# Patient Record
Sex: FEMALE | Race: BLACK OR AFRICAN AMERICAN | NOT HISPANIC OR LATINO | URBAN - METROPOLITAN AREA
[De-identification: names, ages, dates, MRNs, and addresses within clinical notes are randomized per-mention and may not be internally consistent; named-entity substitution may affect disease eponyms.]

---

## 2017-12-18 ENCOUNTER — OUTPATIENT (OUTPATIENT)
Dept: OUTPATIENT SERVICES | Facility: HOSPITAL | Age: 37
LOS: 1 days | End: 2017-12-18
Payer: COMMERCIAL

## 2017-12-18 PROCEDURE — 76641 ULTRASOUND BREAST COMPLETE: CPT

## 2017-12-18 PROCEDURE — 76641 ULTRASOUND BREAST COMPLETE: CPT | Mod: 26,RT

## 2017-12-20 ENCOUNTER — OUTPATIENT (OUTPATIENT)
Dept: OUTPATIENT SERVICES | Facility: HOSPITAL | Age: 37
LOS: 1 days | End: 2017-12-20
Payer: COMMERCIAL

## 2017-12-20 PROCEDURE — 19083 BX BREAST 1ST LESION US IMAG: CPT

## 2017-12-20 PROCEDURE — 77065 DX MAMMO INCL CAD UNI: CPT

## 2017-12-20 PROCEDURE — 19083 BX BREAST 1ST LESION US IMAG: CPT | Mod: RT

## 2017-12-20 PROCEDURE — 88305 TISSUE EXAM BY PATHOLOGIST: CPT

## 2017-12-20 PROCEDURE — G0206: CPT | Mod: 26,RT

## 2017-12-20 PROCEDURE — A4648: CPT

## 2017-12-21 LAB — SURGICAL PATHOLOGY STUDY: SIGNIFICANT CHANGE UP

## 2019-02-12 ENCOUNTER — OUTPATIENT (OUTPATIENT)
Dept: OUTPATIENT SERVICES | Facility: HOSPITAL | Age: 39
LOS: 1 days | End: 2019-02-12
Payer: COMMERCIAL

## 2019-02-12 PROCEDURE — 76641 ULTRASOUND BREAST COMPLETE: CPT | Mod: 26,50

## 2019-02-12 PROCEDURE — 76641 ULTRASOUND BREAST COMPLETE: CPT

## 2019-10-01 PROBLEM — Z00.00 ENCOUNTER FOR PREVENTIVE HEALTH EXAMINATION: Status: ACTIVE | Noted: 2019-10-01

## 2019-10-02 ENCOUNTER — NON-APPOINTMENT (OUTPATIENT)
Age: 39
End: 2019-10-02

## 2019-10-02 ENCOUNTER — APPOINTMENT (OUTPATIENT)
Dept: HEART AND VASCULAR | Facility: CLINIC | Age: 39
End: 2019-10-02
Payer: COMMERCIAL

## 2019-10-02 VITALS
HEART RATE: 66 BPM | DIASTOLIC BLOOD PRESSURE: 68 MMHG | WEIGHT: 146 LBS | HEIGHT: 69 IN | SYSTOLIC BLOOD PRESSURE: 109 MMHG | OXYGEN SATURATION: 100 % | BODY MASS INDEX: 21.62 KG/M2

## 2019-10-02 DIAGNOSIS — Z86.2 PERSONAL HISTORY OF DISEASES OF THE BLOOD AND BLOOD-FORMING ORGANS AND CERTAIN DISORDERS INVOLVING THE IMMUNE MECHANISM: ICD-10-CM

## 2019-10-02 PROCEDURE — 99204 OFFICE O/P NEW MOD 45 MIN: CPT | Mod: 25

## 2019-10-02 PROCEDURE — 93000 ELECTROCARDIOGRAM COMPLETE: CPT

## 2019-10-03 NOTE — PHYSICAL EXAM
[General Appearance - Well Developed] : well developed [Normal Appearance] : normal appearance [Well Groomed] : well groomed [General Appearance - Well Nourished] : well nourished [No Deformities] : no deformities [General Appearance - In No Acute Distress] : no acute distress [Normal Conjunctiva] : the conjunctiva exhibited no abnormalities [Eyelids - No Xanthelasma] : the eyelids demonstrated no xanthelasmas [No Oral Pallor] : no oral pallor [Normal Oral Mucosa] : normal oral mucosa [No Oral Cyanosis] : no oral cyanosis [Normal Jugular Venous A Waves Present] : normal jugular venous A waves present [Normal Jugular Venous V Waves Present] : normal jugular venous V waves present [No Jugular Venous Doss A Waves] : no jugular venous doss A waves [Heart Rate And Rhythm] : heart rate and rhythm were normal [Heart Sounds] : normal S1 and S2 [Murmurs] : no murmurs present [Respiration, Rhythm And Depth] : normal respiratory rhythm and effort [Exaggerated Use Of Accessory Muscles For Inspiration] : no accessory muscle use [Auscultation Breath Sounds / Voice Sounds] : lungs were clear to auscultation bilaterally [Abdomen Soft] : soft [Abdomen Tenderness] : non-tender [Abdomen Mass (___ Cm)] : no abdominal mass palpated [Abnormal Walk] : normal gait [Gait - Sufficient For Exercise Testing] : the gait was sufficient for exercise testing [Nail Clubbing] : no clubbing of the fingernails [Cyanosis, Localized] : no localized cyanosis [Petechial Hemorrhages (___cm)] : no petechial hemorrhages [Skin Color & Pigmentation] : normal skin color and pigmentation [] : no rash [No Venous Stasis] : no venous stasis [Skin Lesions] : no skin lesions [No Skin Ulcers] : no skin ulcer [Oriented To Time, Place, And Person] : oriented to person, place, and time [No Xanthoma] : no  xanthoma was observed [Affect] : the affect was normal [No Anxiety] : not feeling anxious [Mood] : the mood was normal

## 2019-10-03 NOTE — REASON FOR VISIT
[Initial Evaluation] : an initial evaluation of [Palpitations] : palpitations [FreeTextEntry1] : Pleasant 39 year old female presents secondary to palpitations. Symptoms noted at rest and with activity. They are brief in nature. No syncope or associated dizziness. At times, the presentation is related to stress and certain foods. No syncope noted. She otherwise is in good health. We are discussing a cardiac work up for the above complains as one has not been done recently.. Occasional chest discomfort is noted as times as well. \par

## 2019-10-18 ENCOUNTER — APPOINTMENT (OUTPATIENT)
Dept: HEART AND VASCULAR | Facility: CLINIC | Age: 39
End: 2019-10-18
Payer: COMMERCIAL

## 2019-10-18 VITALS
TEMPERATURE: 98.1 F | SYSTOLIC BLOOD PRESSURE: 119 MMHG | DIASTOLIC BLOOD PRESSURE: 71 MMHG | HEART RATE: 77 BPM | OXYGEN SATURATION: 100 %

## 2019-10-18 PROCEDURE — 93306 TTE W/DOPPLER COMPLETE: CPT

## 2019-10-18 PROCEDURE — 99214 OFFICE O/P EST MOD 30 MIN: CPT | Mod: 25

## 2019-10-18 PROCEDURE — 99214 OFFICE O/P EST MOD 30 MIN: CPT

## 2019-10-18 NOTE — PHYSICAL EXAM
[General Appearance - Well Developed] : well developed [Normal Appearance] : normal appearance [Well Groomed] : well groomed [General Appearance - Well Nourished] : well nourished [No Deformities] : no deformities [General Appearance - In No Acute Distress] : no acute distress [Eyelids - No Xanthelasma] : the eyelids demonstrated no xanthelasmas [Normal Conjunctiva] : the conjunctiva exhibited no abnormalities [Normal Oral Mucosa] : normal oral mucosa [Normal Jugular Venous A Waves Present] : normal jugular venous A waves present [No Oral Cyanosis] : no oral cyanosis [No Oral Pallor] : no oral pallor [Normal Jugular Venous V Waves Present] : normal jugular venous V waves present [No Jugular Venous Doss A Waves] : no jugular venous doss A waves [Respiration, Rhythm And Depth] : normal respiratory rhythm and effort [Exaggerated Use Of Accessory Muscles For Inspiration] : no accessory muscle use [Auscultation Breath Sounds / Voice Sounds] : lungs were clear to auscultation bilaterally [Heart Rate And Rhythm] : heart rate and rhythm were normal [Heart Sounds] : normal S1 and S2 [Abdomen Soft] : soft [Murmurs] : no murmurs present [Abdomen Mass (___ Cm)] : no abdominal mass palpated [Abdomen Tenderness] : non-tender [Abnormal Walk] : normal gait [Gait - Sufficient For Exercise Testing] : the gait was sufficient for exercise testing [Nail Clubbing] : no clubbing of the fingernails [Cyanosis, Localized] : no localized cyanosis [Petechial Hemorrhages (___cm)] : no petechial hemorrhages [Skin Color & Pigmentation] : normal skin color and pigmentation [] : no rash [No Venous Stasis] : no venous stasis [Skin Lesions] : no skin lesions [No Xanthoma] : no  xanthoma was observed [No Skin Ulcers] : no skin ulcer [Oriented To Time, Place, And Person] : oriented to person, place, and time [Affect] : the affect was normal [No Anxiety] : not feeling anxious [Mood] : the mood was normal

## 2019-10-18 NOTE — REASON FOR VISIT
[Follow-Up - Clinic] : a clinic follow-up of [Palpitations] : palpitations [FreeTextEntry1] : Ms. APARICIO presents for a follow up today. She denies chest pain, dyspnea or syncope. No issues reported with her medications. Otherwise, she is feeling well. Remarks on occasional palpitations. Still the final holter report is pending. Echo shows preserved LV function. Holter shows presence of occasional PVCs. \par

## 2019-11-15 ENCOUNTER — APPOINTMENT (OUTPATIENT)
Dept: HEART AND VASCULAR | Facility: CLINIC | Age: 39
End: 2019-11-15

## 2020-05-20 ENCOUNTER — APPOINTMENT (OUTPATIENT)
Dept: HEART AND VASCULAR | Facility: CLINIC | Age: 40
End: 2020-05-20
Payer: COMMERCIAL

## 2020-05-20 PROCEDURE — 99214 OFFICE O/P EST MOD 30 MIN: CPT | Mod: 95

## 2020-05-20 NOTE — PHYSICAL EXAM
[Normal Appearance] : normal appearance [General Appearance - Well Developed] : well developed [General Appearance - Well Nourished] : well nourished [Well Groomed] : well groomed [General Appearance - In No Acute Distress] : no acute distress [No Deformities] : no deformities

## 2020-05-20 NOTE — DISCUSSION/SUMMARY
[FreeTextEntry1] : PVCs, symptomatic holter and echo reviewed, current symptoms appear different\par Palps likely a response to anemia and pregnancy, encouraged PO hydration and balanced diet. Inclined towards a conservative follow up in this patient. We had a careful discussion regarding diet and exercise. Will be happy to re-evaluate.\par

## 2020-05-20 NOTE — REASON FOR VISIT
[Palpitations] : palpitations [Follow-Up - Clinic] : a clinic follow-up of [FreeTextEntry1] : This visit was provided via telehealth using real-time 2 way audio visual technology. The patient, ANGEL APARICIO, was located at home, [address] [place] [address] \[address]\[place] [address] , at the time of the vist. The Doctor, Javier Leigh MD, [facility], was located at his medical office located at [address] [place] [address], [place] [address] at the time of the visit. The patient, ANGEL APARICIO and the Doctor participated in telehealth encounter. Verbal consent was given on May 20, 2020  by the patient, self.\par \par Notes occasional palpitations. This is noted at rest. No syncope or dizziness. She was told that she was low on Fe and had an infusion as well. In addition, she is 9 months pregnant and due in a few weeks.

## 2020-05-26 ENCOUNTER — APPOINTMENT (OUTPATIENT)
Dept: HEART AND VASCULAR | Facility: CLINIC | Age: 40
End: 2020-05-26
Payer: COMMERCIAL

## 2020-05-26 PROCEDURE — 99214 OFFICE O/P EST MOD 30 MIN: CPT | Mod: 95

## 2020-05-26 NOTE — REASON FOR VISIT
[Follow-Up - Clinic] : a clinic follow-up of [Palpitations] : palpitations [FreeTextEntry1] : This visit was provided via telehealth using real-time 2 way audio visual technology. The patient, ANGEL APARICIO, was located at home, [address] [place] [address] \[address]\[place] [address] , at the time of the vist. The Doctor, Javier Leigh MD, [facility], was located at his medical office located at [address] [place] [address], [address] Floor, [place] [address] at the time of the visit. The patient, ANGEL APARICIO and the Doctor participated in telehealth encounter. Verbal consent was given on May 26, 2020  by the patient, self.\par \par Occasional palpitations still noted since last we spoke. No chest discomfort or syncope. However, these are new symptoms since her last cardiac work up with me.

## 2020-05-26 NOTE — DISCUSSION/SUMMARY
[FreeTextEntry1] : Palps/PVCs as the patient is pregnant, will bring in for an echocardiogram provided her insurance company feels that it is a reasonable course of action and deems appropriate to approve.\par

## 2020-05-27 ENCOUNTER — APPOINTMENT (OUTPATIENT)
Dept: HEART AND VASCULAR | Facility: CLINIC | Age: 40
End: 2020-05-27
Payer: COMMERCIAL

## 2020-05-27 ENCOUNTER — APPOINTMENT (OUTPATIENT)
Dept: HEART AND VASCULAR | Facility: CLINIC | Age: 40
End: 2020-05-27

## 2020-05-27 VITALS
SYSTOLIC BLOOD PRESSURE: 106 MMHG | OXYGEN SATURATION: 100 % | BODY MASS INDEX: 27.4 KG/M2 | HEART RATE: 76 BPM | DIASTOLIC BLOOD PRESSURE: 63 MMHG | WEIGHT: 185 LBS | TEMPERATURE: 98.2 F | HEIGHT: 69 IN

## 2020-05-27 PROCEDURE — 99214 OFFICE O/P EST MOD 30 MIN: CPT | Mod: 25

## 2020-05-27 PROCEDURE — 93306 TTE W/DOPPLER COMPLETE: CPT

## 2020-05-28 NOTE — DISCUSSION/SUMMARY
[FreeTextEntry1] : CP/PVCs echo reviewed. Inclined towards a conservative follow up in this patient. We had a careful discussion regarding diet and exercise. Will be happy to re-evaluate.\par Report provided.

## 2020-05-28 NOTE — REASON FOR VISIT
[Follow-Up - Clinic] : a clinic follow-up of [Palpitations] : palpitations [FreeTextEntry1] : Doing well. She completed an echo today as part of evaluation of her palpitations. The study is remarkable for preserved LVEF and no valvular issues. No effusion noted. She is otherwise seem well. B/p normal and heart rate under good control. No syncopal events.

## 2020-05-28 NOTE — PHYSICAL EXAM
[General Appearance - Well Developed] : well developed [Normal Appearance] : normal appearance [Well Groomed] : well groomed [General Appearance - Well Nourished] : well nourished [No Deformities] : no deformities [General Appearance - In No Acute Distress] : no acute distress [Normal Conjunctiva] : the conjunctiva exhibited no abnormalities [Eyelids - No Xanthelasma] : the eyelids demonstrated no xanthelasmas [No Oral Pallor] : no oral pallor [Normal Oral Mucosa] : normal oral mucosa [No Oral Cyanosis] : no oral cyanosis [Normal Jugular Venous A Waves Present] : normal jugular venous A waves present [Normal Jugular Venous V Waves Present] : normal jugular venous V waves present [No Jugular Venous Doss A Waves] : no jugular venous doss A waves [Respiration, Rhythm And Depth] : normal respiratory rhythm and effort [Exaggerated Use Of Accessory Muscles For Inspiration] : no accessory muscle use [Auscultation Breath Sounds / Voice Sounds] : lungs were clear to auscultation bilaterally [Heart Rate And Rhythm] : heart rate and rhythm were normal [Heart Sounds] : normal S1 and S2 [Murmurs] : no murmurs present [Abdomen Soft] : soft [Abdomen Tenderness] : non-tender [Abdomen Mass (___ Cm)] : no abdominal mass palpated [Abnormal Walk] : normal gait [Gait - Sufficient For Exercise Testing] : the gait was sufficient for exercise testing [Nail Clubbing] : no clubbing of the fingernails [Cyanosis, Localized] : no localized cyanosis [Petechial Hemorrhages (___cm)] : no petechial hemorrhages [Skin Color & Pigmentation] : normal skin color and pigmentation [] : no rash [No Venous Stasis] : no venous stasis [Skin Lesions] : no skin lesions [No Skin Ulcers] : no skin ulcer [No Xanthoma] : no  xanthoma was observed [Affect] : the affect was normal [Oriented To Time, Place, And Person] : oriented to person, place, and time [Mood] : the mood was normal [No Anxiety] : not feeling anxious

## 2020-08-25 ENCOUNTER — APPOINTMENT (OUTPATIENT)
Dept: HEART AND VASCULAR | Facility: CLINIC | Age: 40
End: 2020-08-25
Payer: COMMERCIAL

## 2020-08-25 PROCEDURE — 99214 OFFICE O/P EST MOD 30 MIN: CPT | Mod: 95

## 2020-08-25 NOTE — DISCUSSION/SUMMARY
[FreeTextEntry1] : Palps/chest pain she appears to be quite comfortable. Encourage to keep an eye on her symptoms. Encourage to see PMD and check blood pressure on occasion. Will have her come for a visit in 6 months.

## 2020-08-25 NOTE — REASON FOR VISIT
[Palpitations] : palpitations [Follow-Up - Clinic] : a clinic follow-up of [FreeTextEntry1] : This visit was provided via telehealth using real-time 2 way audio visual technology. The patient, ANGEL APARICIO, was located at home, [address] [place] [address] \[address]\[place] [address] , at the time of the vist. The Doctor, Javier Leigh MD, [facility], was located at his medical office located at [address] [place] [address], [place] [address] at the time of the visit. The patient, ANGEL APARICIO and the Doctor participated in telehealth encounter. Verbal consent was given on Aug 25, 2020  by the patient, self.\par \par Doing well. No chest discomfort. No palpitations. She delivered her child without issues. She did have some blood pressure issues but it was attributed to medications given at that time. No new testing. We are discussing a cardiac work up for the above complains as one has not been done recently.\par

## 2021-03-02 ENCOUNTER — TRANSCRIPTION ENCOUNTER (OUTPATIENT)
Age: 41
End: 2021-03-02

## 2021-04-07 ENCOUNTER — NON-APPOINTMENT (OUTPATIENT)
Age: 41
End: 2021-04-07

## 2021-04-07 ENCOUNTER — APPOINTMENT (OUTPATIENT)
Dept: HEART AND VASCULAR | Facility: CLINIC | Age: 41
End: 2021-04-07
Payer: COMMERCIAL

## 2021-04-07 VITALS
TEMPERATURE: 97.3 F | HEIGHT: 69 IN | WEIGHT: 158 LBS | HEART RATE: 62 BPM | BODY MASS INDEX: 23.4 KG/M2 | SYSTOLIC BLOOD PRESSURE: 136 MMHG | OXYGEN SATURATION: 100 % | DIASTOLIC BLOOD PRESSURE: 87 MMHG

## 2021-04-07 PROCEDURE — 99072 ADDL SUPL MATRL&STAF TM PHE: CPT

## 2021-04-07 PROCEDURE — 99214 OFFICE O/P EST MOD 30 MIN: CPT

## 2021-04-07 PROCEDURE — 93000 ELECTROCARDIOGRAM COMPLETE: CPT

## 2021-04-08 NOTE — PHYSICAL EXAM
[General Appearance - Well Developed] : well developed [Normal Appearance] : normal appearance [Well Groomed] : well groomed [General Appearance - Well Nourished] : well nourished [No Deformities] : no deformities [General Appearance - In No Acute Distress] : no acute distress [Normal Conjunctiva] : the conjunctiva exhibited no abnormalities [Eyelids - No Xanthelasma] : the eyelids demonstrated no xanthelasmas [Normal Oral Mucosa] : normal oral mucosa [No Oral Pallor] : no oral pallor [No Oral Cyanosis] : no oral cyanosis [Normal Jugular Venous A Waves Present] : normal jugular venous A waves present [Normal Jugular Venous V Waves Present] : normal jugular venous V waves present [No Jugular Venous Doss A Waves] : no jugular venous doss A waves [Respiration, Rhythm And Depth] : normal respiratory rhythm and effort [Exaggerated Use Of Accessory Muscles For Inspiration] : no accessory muscle use [Auscultation Breath Sounds / Voice Sounds] : lungs were clear to auscultation bilaterally [Heart Rate And Rhythm] : heart rate and rhythm were normal [Heart Sounds] : normal S1 and S2 [Murmurs] : no murmurs present [Abdomen Soft] : soft [Abdomen Tenderness] : non-tender [Abdomen Mass (___ Cm)] : no abdominal mass palpated [Abnormal Walk] : normal gait [Gait - Sufficient For Exercise Testing] : the gait was sufficient for exercise testing [Nail Clubbing] : no clubbing of the fingernails [Cyanosis, Localized] : no localized cyanosis [Petechial Hemorrhages (___cm)] : no petechial hemorrhages [Skin Color & Pigmentation] : normal skin color and pigmentation [] : no rash [No Venous Stasis] : no venous stasis [Skin Lesions] : no skin lesions [No Skin Ulcers] : no skin ulcer [No Xanthoma] : no  xanthoma was observed [Oriented To Time, Place, And Person] : oriented to person, place, and time [Affect] : the affect was normal [Mood] : the mood was normal [No Anxiety] : not feeling anxious

## 2021-04-08 NOTE — REASON FOR VISIT
[Follow-Up - Clinic] : a clinic follow-up of [Palpitations] : palpitations [FreeTextEntry1] : Comes in for a follow up and re-evaluation. She delivered her child without issues. She did have some blood pressure issues but it was attributed to medications given at that time. She remarks on occasional palpitations. No new testing. We are discussing a cardiac work up for the above complains as one has not been done recently.\par

## 2021-04-08 NOTE — DISCUSSION/SUMMARY
[FreeTextEntry1] : CP/palps/PVCs MINELVA and I had a discussion of his symptoms. Her risk for CAD falls intro intermediate. We will therefore move forward with a stress ekg and echocardiogram at this time to evaluate for obstructive CAD, provided an insurance approval can be obtained. Risks and benefits discussed.\par Elevated b/p reading will continue to follow up and re-evaluate

## 2021-05-12 ENCOUNTER — APPOINTMENT (OUTPATIENT)
Dept: HEART AND VASCULAR | Facility: CLINIC | Age: 41
End: 2021-05-12
Payer: COMMERCIAL

## 2021-05-12 VITALS
HEIGHT: 69 IN | OXYGEN SATURATION: 99 % | WEIGHT: 158 LBS | HEART RATE: 98 BPM | DIASTOLIC BLOOD PRESSURE: 78 MMHG | SYSTOLIC BLOOD PRESSURE: 131 MMHG | TEMPERATURE: 97.7 F | BODY MASS INDEX: 23.4 KG/M2

## 2021-05-12 PROCEDURE — 93015 CV STRESS TEST SUPVJ I&R: CPT

## 2021-05-12 PROCEDURE — 99072 ADDL SUPL MATRL&STAF TM PHE: CPT

## 2021-05-12 PROCEDURE — 93306 TTE W/DOPPLER COMPLETE: CPT

## 2021-05-12 PROCEDURE — 99214 OFFICE O/P EST MOD 30 MIN: CPT | Mod: 25

## 2021-05-12 NOTE — REASON FOR VISIT
[Other: ____] : [unfilled] [FreeTextEntry1] : Comes in for a follow up and re-evaluation. She delivered her child without issues. She did have some blood pressure issues but it was attributed to medications given at that time. She remarks on occasional palpitations. She completed a stress echocardiogram today. They study showed a preserved LV systolic function. She had a few PVCs in recovery. Her exercise time was a bit on the lower end, but study was otherwise unremarkable.

## 2021-05-12 NOTE — DISCUSSION/SUMMARY
[FreeTextEntry1] : CP/palpitations Inclined towards a conservative follow up in this patient. We had a careful discussion regarding diet and exercise. Will be happy to re-evaluate.\par Encourage diet and exercise\par Careful symptom monitoring\par Will re-evaluate in 6 months.

## 2021-11-08 ENCOUNTER — APPOINTMENT (OUTPATIENT)
Dept: HEART AND VASCULAR | Facility: CLINIC | Age: 41
End: 2021-11-08
Payer: COMMERCIAL

## 2021-11-08 VITALS
TEMPERATURE: 97.5 F | WEIGHT: 157 LBS | SYSTOLIC BLOOD PRESSURE: 123 MMHG | OXYGEN SATURATION: 100 % | HEIGHT: 69 IN | DIASTOLIC BLOOD PRESSURE: 74 MMHG | HEART RATE: 77 BPM | BODY MASS INDEX: 23.25 KG/M2

## 2021-11-08 PROCEDURE — 99214 OFFICE O/P EST MOD 30 MIN: CPT

## 2021-11-08 RX ORDER — NORETHINDRONE 0.35 MG/1
0.35 TABLET ORAL
Qty: 84 | Refills: 0 | Status: ACTIVE | COMMUNITY
Start: 2021-04-05

## 2021-11-08 RX ORDER — CLARITHROMYCIN 500 MG/1
500 TABLET, FILM COATED ORAL
Qty: 28 | Refills: 0 | Status: ACTIVE | COMMUNITY
Start: 2021-11-01

## 2021-11-08 RX ORDER — NYSTATIN AND TRIAMCINOLONE ACETONIDE 100000; 1 [USP'U]/G; MG/G
100000-0.1 OINTMENT TOPICAL
Qty: 30 | Refills: 0 | Status: ACTIVE | COMMUNITY
Start: 2021-10-16

## 2021-11-08 RX ORDER — SODIUM SULFATE, POTASSIUM SULFATE, MAGNESIUM SULFATE 17.5; 3.13; 1.6 G/ML; G/ML; G/ML
17.5-3.13-1.6 SOLUTION, CONCENTRATE ORAL
Refills: 0 | Status: COMPLETED | COMMUNITY
Start: 2021-10-04 | End: 2021-11-08

## 2021-11-08 RX ORDER — AMOXICILLIN 500 MG/1
500 CAPSULE ORAL
Qty: 56 | Refills: 0 | Status: ACTIVE | COMMUNITY
Start: 2021-11-01

## 2021-11-08 NOTE — REASON FOR VISIT
[Other: ____] : [unfilled] [Symptom and Test Evaluation] : symptom and test evaluation [Hypertension] : hypertension [FreeTextEntry1] : Comes in for a follow up and re-evaluation. She delivered her child without issues. She did have some blood pressure issues but it was attributed to medications given at that time. She remarks on occasional palpitations. She completed a stress echocardiogram today. They study showed a preserved LV systolic function. She had a few PVCs in recovery. Her exercise time was a bit on the lower end, but study was otherwise unremarkable. \par \par Since her last visit she has been well. She was diagnosed with Hpylori and being treated with antibiotics. Denies cp, SOB/IBARRA, palpitations, KAREN and dizziness. ET: Cardio 3x/wk and kick boxing.

## 2021-11-08 NOTE — DISCUSSION/SUMMARY
[FreeTextEntry1] : Borderline HTN - MINELVA  and I had an extensive discussion regarding his blood pressure management. Patient will continue taking current medications in addition to maintaining a low Na diet, with periodic b/p checks at home.\par PVCs/CP/palps Inclined towards a conservative follow up in this patient. We had a careful discussion regarding diet and exercise. Will be happy to re-evaluate.\par

## 2022-05-06 ENCOUNTER — NON-APPOINTMENT (OUTPATIENT)
Age: 42
End: 2022-05-06

## 2022-05-09 ENCOUNTER — NON-APPOINTMENT (OUTPATIENT)
Age: 42
End: 2022-05-09

## 2022-05-09 ENCOUNTER — APPOINTMENT (OUTPATIENT)
Dept: HEART AND VASCULAR | Facility: CLINIC | Age: 42
End: 2022-05-09
Payer: COMMERCIAL

## 2022-05-09 VITALS
OXYGEN SATURATION: 100 % | SYSTOLIC BLOOD PRESSURE: 130 MMHG | BODY MASS INDEX: 23.25 KG/M2 | HEART RATE: 63 BPM | WEIGHT: 157 LBS | TEMPERATURE: 97.3 F | HEIGHT: 69 IN | DIASTOLIC BLOOD PRESSURE: 70 MMHG

## 2022-05-09 PROCEDURE — 99214 OFFICE O/P EST MOD 30 MIN: CPT

## 2022-05-09 PROCEDURE — 93000 ELECTROCARDIOGRAM COMPLETE: CPT

## 2022-05-09 RX ORDER — METRONIDAZOLE 500 MG/1
500 TABLET ORAL
Qty: 14 | Refills: 0 | Status: ACTIVE | COMMUNITY
Start: 2022-01-24

## 2022-05-09 RX ORDER — AZELASTINE HYDROCHLORIDE 137 UG/1
0.1 SPRAY, METERED NASAL
Qty: 30 | Refills: 0 | Status: ACTIVE | COMMUNITY
Start: 2020-12-08

## 2022-05-09 NOTE — DISCUSSION/SUMMARY
[FreeTextEntry1] : Palps/cp MINELVA and I had a discussion of his symptoms. Her risk for CAD falls intro intermediate. We will therefore move forward with a stress ekg and echocardiogram at this time to evaluate for obstructive CAD and risk stratification, provided an insurance approval can be obtained. My preference would be stress echocardiogram, but it is unlikely to be approved at this time. Risks and benefits discussed.\par EKG section of the chart --- secondary to symptoms above an electrocardiogram also known as an EKG was performed.  Risks and benefits discussed with the patient. Patient was given time and privacy to changed into a gown. Shortly after, standard 10 leads were applied and a [employer] system was used to perform the study. The results were subsequently reviewed by attending physician and discussed with the patient. The study showed a normal sinus rhythm and no ST-T suggestive of ischemia. Order for the EKG was placed in the chart. The results were documented. Billing submitted. Emotional support provided.\par

## 2022-05-09 NOTE — REASON FOR VISIT
[Symptom and Test Evaluation] : symptom and test evaluation [FreeTextEntry1] : Comes in for a follow up and re-evaluation. She remarks on new worsening, mod complexity symptoms of palpitations. This is often noted with activity. Symptoms always improve at rest. No chest pain noted. No recent cardiac testing. Our objective today is to discuss several chronic medical conditions, with acute on chronic worsening and explore management options, including but not limited to medications, behavioral changes, additional testing or intervention.\par

## 2022-06-15 ENCOUNTER — APPOINTMENT (OUTPATIENT)
Dept: HEART AND VASCULAR | Facility: CLINIC | Age: 42
End: 2022-06-15
Payer: COMMERCIAL

## 2022-06-15 VITALS
HEIGHT: 69 IN | BODY MASS INDEX: 23.4 KG/M2 | DIASTOLIC BLOOD PRESSURE: 83 MMHG | TEMPERATURE: 98.2 F | SYSTOLIC BLOOD PRESSURE: 121 MMHG | HEART RATE: 89 BPM | OXYGEN SATURATION: 99 % | WEIGHT: 158 LBS

## 2022-06-15 PROCEDURE — 93306 TTE W/DOPPLER COMPLETE: CPT

## 2022-06-15 PROCEDURE — 93015 CV STRESS TEST SUPVJ I&R: CPT

## 2022-06-15 PROCEDURE — 99213 OFFICE O/P EST LOW 20 MIN: CPT | Mod: 25

## 2022-06-15 NOTE — REASON FOR VISIT
[Symptom and Test Evaluation] : symptom and test evaluation [FreeTextEntry1] : Follow up of palpitations and dizziness s/p echo and a stress ekg. We are discussing results. Symptoms improving.

## 2022-06-15 NOTE — DISCUSSION/SUMMARY
[FreeTextEntry1] : Palps/CP/dizziness studies reviewed. Inclined towards a conservative follow up in this patient. We had a careful discussion regarding diet and exercise. Will be happy to re-evaluate.\par Borderline HTN - MINELVA  and I had an extensive discussion regarding his blood pressure management. Patient will continue taking current medications in addition to maintaining a low Na diet, with periodic b/p checks at home.\par

## 2022-07-26 ENCOUNTER — RESULT REVIEW (OUTPATIENT)
Age: 42
End: 2022-07-26

## 2022-07-26 ENCOUNTER — APPOINTMENT (OUTPATIENT)
Dept: ULTRASOUND IMAGING | Facility: HOSPITAL | Age: 42
End: 2022-07-26

## 2022-07-26 ENCOUNTER — OUTPATIENT (OUTPATIENT)
Dept: OUTPATIENT SERVICES | Facility: HOSPITAL | Age: 42
LOS: 1 days | End: 2022-07-26
Payer: COMMERCIAL

## 2022-07-26 PROCEDURE — 88305 TISSUE EXAM BY PATHOLOGIST: CPT | Mod: 26

## 2022-07-26 PROCEDURE — 88360 TUMOR IMMUNOHISTOCHEM/MANUAL: CPT | Mod: 26

## 2022-07-26 PROCEDURE — 88300 SURGICAL PATH GROSS: CPT | Mod: 26,59

## 2022-07-26 PROCEDURE — 88360 TUMOR IMMUNOHISTOCHEM/MANUAL: CPT

## 2022-07-26 PROCEDURE — 77065 DX MAMMO INCL CAD UNI: CPT

## 2022-07-26 PROCEDURE — 88342 IMHCHEM/IMCYTCHM 1ST ANTB: CPT | Mod: 26,59

## 2022-07-26 PROCEDURE — 76942 ECHO GUIDE FOR BIOPSY: CPT

## 2022-07-26 PROCEDURE — 88305 TISSUE EXAM BY PATHOLOGIST: CPT

## 2022-07-26 PROCEDURE — A4648: CPT

## 2022-07-26 PROCEDURE — 88341 IMHCHEM/IMCYTCHM EA ADD ANTB: CPT

## 2022-07-26 PROCEDURE — 19083 BX BREAST 1ST LESION US IMAG: CPT

## 2022-07-26 PROCEDURE — 19083 BX BREAST 1ST LESION US IMAG: CPT | Mod: LT

## 2022-07-26 PROCEDURE — 77065 DX MAMMO INCL CAD UNI: CPT | Mod: 26,LT

## 2022-07-26 PROCEDURE — 88341 IMHCHEM/IMCYTCHM EA ADD ANTB: CPT | Mod: 26,59

## 2022-07-26 PROCEDURE — 88300 SURGICAL PATH GROSS: CPT

## 2022-07-26 PROCEDURE — 38505 NEEDLE BIOPSY LYMPH NODES: CPT

## 2022-07-29 DIAGNOSIS — R92.8 OTHER ABNORMAL AND INCONCLUSIVE FINDINGS ON DIAGNOSTIC IMAGING OF BREAST: ICD-10-CM

## 2022-07-29 DIAGNOSIS — R59.0 LOCALIZED ENLARGED LYMPH NODES: ICD-10-CM

## 2022-07-29 DIAGNOSIS — R59.9 ENLARGED LYMPH NODES, UNSPECIFIED: ICD-10-CM

## 2022-07-29 LAB — SURGICAL PATHOLOGY STUDY: SIGNIFICANT CHANGE UP

## 2022-11-11 ENCOUNTER — OUTPATIENT (OUTPATIENT)
Dept: OUTPATIENT SERVICES | Facility: HOSPITAL | Age: 42
LOS: 1 days | End: 2022-11-11
Payer: COMMERCIAL

## 2022-11-11 ENCOUNTER — RESULT REVIEW (OUTPATIENT)
Age: 42
End: 2022-11-11

## 2022-11-11 ENCOUNTER — APPOINTMENT (OUTPATIENT)
Dept: ULTRASOUND IMAGING | Facility: HOSPITAL | Age: 42
End: 2022-11-11

## 2022-11-11 PROCEDURE — 76642 ULTRASOUND BREAST LIMITED: CPT | Mod: 26,LT

## 2022-11-11 PROCEDURE — 76642 ULTRASOUND BREAST LIMITED: CPT

## 2022-12-16 ENCOUNTER — APPOINTMENT (OUTPATIENT)
Dept: HEART AND VASCULAR | Facility: CLINIC | Age: 42
End: 2022-12-16

## 2023-01-24 ENCOUNTER — RESULT REVIEW (OUTPATIENT)
Age: 43
End: 2023-01-24

## 2023-01-24 ENCOUNTER — APPOINTMENT (OUTPATIENT)
Dept: MAMMOGRAPHY | Facility: HOSPITAL | Age: 43
End: 2023-01-24
Payer: COMMERCIAL

## 2023-01-24 ENCOUNTER — OUTPATIENT (OUTPATIENT)
Dept: OUTPATIENT SERVICES | Facility: HOSPITAL | Age: 43
LOS: 1 days | End: 2023-01-24
Payer: COMMERCIAL

## 2023-01-24 ENCOUNTER — APPOINTMENT (OUTPATIENT)
Dept: ULTRASOUND IMAGING | Facility: HOSPITAL | Age: 43
End: 2023-01-24

## 2023-01-24 PROCEDURE — 76641 ULTRASOUND BREAST COMPLETE: CPT | Mod: 26,50

## 2023-01-24 PROCEDURE — 77067 SCR MAMMO BI INCL CAD: CPT

## 2023-01-24 PROCEDURE — 77063 BREAST TOMOSYNTHESIS BI: CPT | Mod: 26

## 2023-01-24 PROCEDURE — 77067 SCR MAMMO BI INCL CAD: CPT | Mod: 26

## 2023-01-24 PROCEDURE — 76641 ULTRASOUND BREAST COMPLETE: CPT

## 2023-01-24 PROCEDURE — 77063 BREAST TOMOSYNTHESIS BI: CPT

## 2023-03-20 ENCOUNTER — APPOINTMENT (OUTPATIENT)
Dept: HEART AND VASCULAR | Facility: CLINIC | Age: 43
End: 2023-03-20
Payer: COMMERCIAL

## 2023-03-20 ENCOUNTER — NON-APPOINTMENT (OUTPATIENT)
Age: 43
End: 2023-03-20

## 2023-03-20 VITALS
BODY MASS INDEX: 22.07 KG/M2 | OXYGEN SATURATION: 100 % | HEIGHT: 69 IN | DIASTOLIC BLOOD PRESSURE: 75 MMHG | SYSTOLIC BLOOD PRESSURE: 132 MMHG | HEART RATE: 67 BPM | WEIGHT: 149 LBS

## 2023-03-20 DIAGNOSIS — R03.0 ELEVATED BLOOD-PRESSURE READING, W/OUT DIAGNOSIS OF HYPERTENSION: ICD-10-CM

## 2023-03-20 DIAGNOSIS — I49.3 VENTRICULAR PREMATURE DEPOLARIZATION: ICD-10-CM

## 2023-03-20 DIAGNOSIS — R00.2 PALPITATIONS: ICD-10-CM

## 2023-03-20 DIAGNOSIS — R07.9 CHEST PAIN, UNSPECIFIED: ICD-10-CM

## 2023-03-20 PROCEDURE — 93000 ELECTROCARDIOGRAM COMPLETE: CPT

## 2023-03-20 PROCEDURE — 99214 OFFICE O/P EST MOD 30 MIN: CPT | Mod: 25

## 2023-03-20 NOTE — REASON FOR VISIT
[Symptom and Test Evaluation] : symptom and test evaluation [FreeTextEntry1] : Follow up of palpitations and dizziness; blood pressure a bit borderline on follow up as well Our objective today is to discuss several chronic medical conditions, with acute on chronic worsening and explore management options, including but not limited to medications, behavioral changes, additional testing or intervention.\par

## 2023-03-20 NOTE — DISCUSSION/SUMMARY
[FreeTextEntry1] : CP/palps/SOB check stress echo if able to approve and schedule.\par Borderline HTN - ANGEL  and I had an extensive discussion regarding his blood pressure management. Patient will continue taking current medications in addition to maintaining a low Na diet, with periodic b/p checks at home.\par HLD ANGEL and I discussed his lipid panel and individualized target LDL goal. At this point, will do diet and exercise with anticipation of re-evaluating labs in 3-6 months\par EKG section of the chart --- secondary to symptoms above an electrocardiogram also known as an EKG was performed.  Risks and benefits discussed with the patient. Patient was given time and privacy to changed into a gown. Shortly after, standard 10 leads were applied and a [employer] system was used to perform the study. The results were subsequently reviewed by attending physician and discussed with the patient. The study showed a normal sinus rhythm and no ST-T suggestive of ischemia. Order for the EKG was placed in the chart. The results were documented. Billing submitted. Emotional support provided.\par

## 2023-07-10 ENCOUNTER — APPOINTMENT (OUTPATIENT)
Dept: HEART AND VASCULAR | Facility: CLINIC | Age: 43
End: 2023-07-10

## 2024-04-30 ENCOUNTER — APPOINTMENT (OUTPATIENT)
Dept: MAMMOGRAPHY | Facility: HOSPITAL | Age: 44
End: 2024-04-30
Payer: COMMERCIAL

## 2024-04-30 ENCOUNTER — OUTPATIENT (OUTPATIENT)
Dept: OUTPATIENT SERVICES | Facility: HOSPITAL | Age: 44
LOS: 1 days | End: 2024-04-30

## 2024-04-30 PROCEDURE — 88305 TISSUE EXAM BY PATHOLOGIST: CPT | Mod: 26

## 2024-04-30 PROCEDURE — A4648: CPT

## 2024-04-30 PROCEDURE — 19081 BX BREAST 1ST LESION STRTCTC: CPT

## 2024-04-30 PROCEDURE — 88305 TISSUE EXAM BY PATHOLOGIST: CPT

## 2024-04-30 PROCEDURE — 19081 BX BREAST 1ST LESION STRTCTC: CPT | Mod: RT

## 2024-04-30 PROCEDURE — 77065 DX MAMMO INCL CAD UNI: CPT

## 2024-04-30 PROCEDURE — 77065 DX MAMMO INCL CAD UNI: CPT | Mod: 26,RT

## 2024-04-30 PROCEDURE — 76098 X-RAY EXAM SURGICAL SPECIMEN: CPT | Mod: 26

## 2024-05-03 LAB — SURGICAL PATHOLOGY STUDY: SIGNIFICANT CHANGE UP

## 2024-07-22 ENCOUNTER — APPOINTMENT (OUTPATIENT)
Dept: MAMMOGRAPHY | Facility: HOSPITAL | Age: 44
End: 2024-07-22

## 2024-07-22 ENCOUNTER — APPOINTMENT (OUTPATIENT)
Dept: ULTRASOUND IMAGING | Facility: HOSPITAL | Age: 44
End: 2024-07-22